# Patient Record
Sex: FEMALE | Race: WHITE | NOT HISPANIC OR LATINO | URBAN - METROPOLITAN AREA
[De-identification: names, ages, dates, MRNs, and addresses within clinical notes are randomized per-mention and may not be internally consistent; named-entity substitution may affect disease eponyms.]

---

## 2021-03-04 ENCOUNTER — OUT OF OFFICE VISIT (OUTPATIENT)
Dept: URBAN - METROPOLITAN AREA MEDICAL CENTER 10 | Facility: MEDICAL CENTER | Age: 66
End: 2021-03-04
Payer: COMMERCIAL

## 2021-03-04 DIAGNOSIS — R19.4 ALTERED BOWEL HABITS: ICD-10-CM

## 2021-03-04 DIAGNOSIS — R74.8 ABNORMAL ALKALINE PHOSPHATASE TEST: ICD-10-CM

## 2021-03-04 DIAGNOSIS — K92.1 BLACK STOOL: ICD-10-CM

## 2021-03-04 DIAGNOSIS — R93.3 ABN FINDINGS-GI TRACT: ICD-10-CM

## 2021-03-04 PROCEDURE — 99204 OFFICE O/P NEW MOD 45 MIN: CPT | Performed by: INTERNAL MEDICINE

## 2021-03-05 ENCOUNTER — OUT OF OFFICE VISIT (OUTPATIENT)
Dept: URBAN - METROPOLITAN AREA MEDICAL CENTER 10 | Facility: MEDICAL CENTER | Age: 66
End: 2021-03-05
Payer: COMMERCIAL

## 2021-03-05 DIAGNOSIS — K31.89 ACQUIRED DEFORMITY OF DUODENUM: ICD-10-CM

## 2021-03-05 DIAGNOSIS — B96.81 BACTERIAL INFECTION DUE TO H. PYLORI: ICD-10-CM

## 2021-03-05 DIAGNOSIS — K29.60 ADENOPAPILLOMATOSIS GASTRICA: ICD-10-CM

## 2021-03-05 DIAGNOSIS — K22.2 ACQUIRED ESOPHAGEAL RING: ICD-10-CM

## 2021-03-05 PROCEDURE — 43239 EGD BIOPSY SINGLE/MULTIPLE: CPT | Performed by: INTERNAL MEDICINE

## 2021-03-22 ENCOUNTER — OFFICE VISIT (OUTPATIENT)
Dept: URBAN - METROPOLITAN AREA CLINIC 78 | Facility: CLINIC | Age: 66
End: 2021-03-22
Payer: COMMERCIAL

## 2021-03-22 VITALS
BODY MASS INDEX: 28.61 KG/M2 | HEIGHT: 64 IN | DIASTOLIC BLOOD PRESSURE: 76 MMHG | HEART RATE: 81 BPM | SYSTOLIC BLOOD PRESSURE: 116 MMHG | RESPIRATION RATE: 16 BRPM | TEMPERATURE: 97.4 F | WEIGHT: 167.6 LBS

## 2021-03-22 DIAGNOSIS — R19.7 DIARRHEA, UNSPECIFIED TYPE: ICD-10-CM

## 2021-03-22 DIAGNOSIS — K76.0 HEPATIC STEATOSIS: ICD-10-CM

## 2021-03-22 DIAGNOSIS — R74.8 ABNORMAL LIVER ENZYMES: ICD-10-CM

## 2021-03-22 DIAGNOSIS — K74.5 BILIARY CIRRHOSIS: ICD-10-CM

## 2021-03-22 PROCEDURE — 99215 OFFICE O/P EST HI 40 MIN: CPT | Performed by: INTERNAL MEDICINE

## 2021-03-22 RX ORDER — SODIUM, POTASSIUM,MAG SULFATES 17.5-3.13G
177 ML DAY 1 AND 177 ML DAY 2 SOLUTION, RECONSTITUTED, ORAL ORAL
Qty: 354 MILLILITER | Refills: 0 | OUTPATIENT
Start: 2021-03-22 | End: 2021-03-24

## 2021-03-22 RX ORDER — URSODIOL 250 MG/1
1 TABLET WITH FOOD TABLET, FILM COATED ORAL THREE TIMES A DAY
Qty: 180 TABLET | Refills: 0 | OUTPATIENT

## 2021-03-22 NOTE — HPI-TODAY'S VISIT:
Patient is seen in post hospital discharge  She had presented there to work up ongoing symptoms for 1 year  As per patient her complains were alternating constipation , diarrhea , dark stools . It started with laxative use for constipation  During the work up she was noted to have Steatohepatitis , Cirrhosis with PHT changes on CT scan  She underwent labs work and dx EGD   SInce discharge from hospital she reports vaginal bleeding ( previous work up by Dr Adam a years ago for post menopausal bleeding was reportedly normal  )    Diarrhea :  Diarrhea is runny and dark  5-7 a day  She was d/c from hospital on Cephalexin for UTI   Has been eating and put food in her stomach   Denies pruritis  Denies periods of confusion  Predominant complain is Fatiguei   Drinks :  Sporadic 1/2 bottle to 1 bottle of wine Stopped Etoh since hospitalizatio   DATA   3/4/21 CT scan IMPRESSION:  1.  Diffuse hepatic steatosis and extensive reticular hepatic parenchymal enhancement. Findings could reflect steatohepatitis and/or steatosis with hepatic fibrosis. No overt morphologic changes of chronic liver disease. 2.  Patent portal vasculature with stigmata of portal hypertension in the form of upper abdominal varices. Fluid third spacing with small volume abdominal pelvic ascites, small left pleural effusion, and mild body wall edema.   Labs :  TB 3.6 > 3.1  DB 1.6   Albumin 2.2   ALT normal  AST  140 ALk PO4 460   INR 1.1 Folic acid 4.4  Vit B 12  661  AMA 41.2  ASMA 1:40 F actin 27 Hepatitis A/B/C negative

## 2021-04-05 ENCOUNTER — OFFICE VISIT (OUTPATIENT)
Dept: URBAN - METROPOLITAN AREA CLINIC 78 | Facility: CLINIC | Age: 66
End: 2021-04-05
Payer: COMMERCIAL

## 2021-04-05 ENCOUNTER — LAB OUTSIDE AN ENCOUNTER (OUTPATIENT)
Dept: URBAN - METROPOLITAN AREA CLINIC 78 | Facility: CLINIC | Age: 66
End: 2021-04-05

## 2021-04-05 VITALS
WEIGHT: 171.6 LBS | HEIGHT: 64 IN | TEMPERATURE: 98 F | HEART RATE: 62 BPM | DIASTOLIC BLOOD PRESSURE: 71 MMHG | BODY MASS INDEX: 29.3 KG/M2 | RESPIRATION RATE: 16 BRPM | SYSTOLIC BLOOD PRESSURE: 102 MMHG

## 2021-04-05 DIAGNOSIS — K74.5 BILIARY CIRRHOSIS: ICD-10-CM

## 2021-04-05 DIAGNOSIS — K22.2 LOWER ESOPHAGEAL RING (SCHATZKI): ICD-10-CM

## 2021-04-05 DIAGNOSIS — K25.9 GASTRIC ULCER, UNSPECIFIED AS ACUTE OR CHRONIC, WITHOUT HEMORRHAGE OR PERFORATION: ICD-10-CM

## 2021-04-05 DIAGNOSIS — K76.0 HEPATIC STEATOSIS: ICD-10-CM

## 2021-04-05 PROBLEM — 235623002: Status: ACTIVE | Noted: 2021-03-23

## 2021-04-05 PROBLEM — 119247004: Status: ACTIVE | Noted: 2021-03-23

## 2021-04-05 PROBLEM — 72007001: Status: ACTIVE | Noted: 2021-03-23

## 2021-04-05 PROBLEM — 723099007: Status: ACTIVE | Noted: 2021-04-05

## 2021-04-05 PROBLEM — 84229001: Status: ACTIVE | Noted: 2021-03-22

## 2021-04-05 PROBLEM — 197321007: Status: ACTIVE | Noted: 2021-03-22

## 2021-04-05 PROBLEM — 1761006: Status: ACTIVE | Noted: 2021-03-22

## 2021-04-05 PROBLEM — 708164002: Status: ACTIVE | Noted: 2021-04-05

## 2021-04-05 PROCEDURE — 99214 OFFICE O/P EST MOD 30 MIN: CPT | Performed by: INTERNAL MEDICINE

## 2021-04-05 RX ORDER — URSODIOL 250 MG/1
1 TABLET WITH FOOD TABLET, FILM COATED ORAL THREE TIMES A DAY
Qty: 180 TABLET | Refills: 0 | Status: ACTIVE | COMMUNITY

## 2021-04-05 RX ORDER — URSODIOL 250 MG/1
1 TABLET WITH FOOD TABLET, FILM COATED ORAL THREE TIMES A DAY
Qty: 180 TABLET | Refills: 0 | OUTPATIENT

## 2021-04-05 RX ORDER — PANTOPRAZOLE SODIUM 40 MG/1
1 TABLET TABLET, DELAYED RELEASE ORAL ONCE A DAY
Qty: 90 | Refills: 1 | OUTPATIENT
Start: 2021-04-05

## 2021-04-05 NOTE — HPI-TODAY'S VISIT:
Patient is seen in follow up  She reports loose stools ( improved down to 5-6 a day )  She has finally started taking abx that was prescribed on d/c from hospital ( it was given for UTI )  Patient continues  denies confusion or insomnia or memory issues  Denies freq urination but freq stooling is a concern   Colonoscopy is scheduled 5/4/21  EGD with path revealed " H.pylori bacteria "  She has not started Benjamin yet  She has not seen Dermatology yet Patient is concerned about her abdominal girth increasing ..      PREVIOUS ENCOUNTER: She had presented there to work up ongoing symptoms for 1 year  As per patient her complains were alternating constipation , diarrhea , dark stools . It started with laxative use for constipation  During the work up she was noted to have Steatohepatitis , Cirrhosis with PHT changes on CT scan  She underwent labs work and dx EGD   SInce discharge from hospital she reports vaginal bleeding ( previous work up by Dr Adam a years ago for post menopausal bleeding was reportedly normal  )    Diarrhea :  Diarrhea is runny and dark  5-7 a day  She was d/c from hospital on Cephalexin for UTI   Has been eating and put food in her stomach   Denies pruritis  Denies periods of confusion  Predominant complain is Fatiguei   Drinks :  Sporadic 1/2 bottle to 1 bottle of wine Stopped Etoh since hospitalizatio   DATA   3/4/21 CT scan IMPRESSION:  1.  Diffuse hepatic steatosis and extensive reticular hepatic parenchymal enhancement. Findings could reflect steatohepatitis and/or steatosis with hepatic fibrosis. No overt morphologic changes of chronic liver disease. 2.  Patent portal vasculature with stigmata of portal hypertension in the form of upper abdominal varices. Fluid third spacing with small volume abdominal pelvic ascites, small left pleural effusion, and mild body wall edema.   Labs :  TB 3.6 > 3.1  DB 1.6   Albumin 2.2   ALT normal  AST  140 ALk PO4 460   INR 1.1 Folic acid 4.4  Vit B 12  661  AMA 41.2  ASMA 1:40 F actin 27 Hepatitis A/B/C negative

## 2021-04-14 ENCOUNTER — OFFICE VISIT (OUTPATIENT)
Dept: URBAN - METROPOLITAN AREA CLINIC 77 | Facility: CLINIC | Age: 66
End: 2021-04-14
Payer: COMMERCIAL

## 2021-04-14 DIAGNOSIS — K76.0 FATTY LIVER: ICD-10-CM

## 2021-04-14 DIAGNOSIS — K74.69 OTHER CIRRHOSIS OF LIVER: ICD-10-CM

## 2021-04-14 PROCEDURE — 76705 ECHO EXAM OF ABDOMEN: CPT | Performed by: INTERNAL MEDICINE

## 2021-04-14 PROCEDURE — 93975 VASCULAR STUDY: CPT | Performed by: INTERNAL MEDICINE

## 2021-04-14 RX ORDER — PANTOPRAZOLE SODIUM 40 MG/1
1 TABLET TABLET, DELAYED RELEASE ORAL ONCE A DAY
Qty: 90 | Refills: 1 | Status: ACTIVE | COMMUNITY
Start: 2021-04-05

## 2021-04-14 RX ORDER — URSODIOL 250 MG/1
1 TABLET WITH FOOD TABLET, FILM COATED ORAL THREE TIMES A DAY
Qty: 180 TABLET | Refills: 0 | Status: ACTIVE | COMMUNITY

## 2021-04-26 ENCOUNTER — OFFICE VISIT (OUTPATIENT)
Dept: URBAN - METROPOLITAN AREA CLINIC 78 | Facility: CLINIC | Age: 66
End: 2021-04-26

## 2021-04-26 ENCOUNTER — DASHBOARD ENCOUNTERS (OUTPATIENT)
Age: 66
End: 2021-04-26

## 2021-04-26 ENCOUNTER — TELEPHONE ENCOUNTER (OUTPATIENT)
Dept: URBAN - METROPOLITAN AREA SURGERY CENTER 30 | Facility: SURGERY CENTER | Age: 66
End: 2021-04-26

## 2021-04-26 RX ORDER — PANTOPRAZOLE SODIUM 40 MG/1
1 TABLET TABLET, DELAYED RELEASE ORAL ONCE A DAY
Qty: 90 | Refills: 1 | Status: ACTIVE | COMMUNITY
Start: 2021-04-05

## 2021-04-26 RX ORDER — URSODIOL 250 MG/1
1 TABLET WITH FOOD TABLET, FILM COATED ORAL THREE TIMES A DAY
Qty: 180 TABLET | Refills: 0 | Status: ACTIVE | COMMUNITY

## 2021-05-04 ENCOUNTER — OFFICE VISIT (OUTPATIENT)
Dept: URBAN - METROPOLITAN AREA SURGERY CENTER 15 | Facility: SURGERY CENTER | Age: 66
End: 2021-05-04
Payer: COMMERCIAL

## 2021-05-04 ENCOUNTER — CLAIMS CREATED FROM THE CLAIM WINDOW (OUTPATIENT)
Dept: URBAN - METROPOLITAN AREA CLINIC 4 | Facility: CLINIC | Age: 66
End: 2021-05-04
Payer: COMMERCIAL

## 2021-05-04 DIAGNOSIS — R19.7 ACUTE DIARRHEA: ICD-10-CM

## 2021-05-04 DIAGNOSIS — K63.5 BENIGN COLON POLYP: ICD-10-CM

## 2021-05-04 DIAGNOSIS — D12.4 BENIGN NEOPLASM OF DESCENDING COLON: ICD-10-CM

## 2021-05-04 DIAGNOSIS — K63.89 STENOSIS OF ILEOCECAL VALVE: ICD-10-CM

## 2021-05-04 DIAGNOSIS — D12.4 ADENOMA OF DESCENDING COLON: ICD-10-CM

## 2021-05-04 PROCEDURE — 45380 COLONOSCOPY AND BIOPSY: CPT | Performed by: INTERNAL MEDICINE

## 2021-05-04 PROCEDURE — G8907 PT DOC NO EVENTS ON DISCHARG: HCPCS | Performed by: INTERNAL MEDICINE

## 2021-05-04 PROCEDURE — 45385 COLONOSCOPY W/LESION REMOVAL: CPT | Performed by: INTERNAL MEDICINE

## 2021-05-04 PROCEDURE — 88305 TISSUE EXAM BY PATHOLOGIST: CPT | Performed by: PATHOLOGY

## 2021-05-04 RX ORDER — PANTOPRAZOLE SODIUM 40 MG/1
1 TABLET TABLET, DELAYED RELEASE ORAL ONCE A DAY
Qty: 90 | Refills: 1 | Status: ACTIVE | COMMUNITY
Start: 2021-04-05

## 2021-05-04 RX ORDER — URSODIOL 250 MG/1
1 TABLET WITH FOOD TABLET, FILM COATED ORAL THREE TIMES A DAY
Qty: 180 TABLET | Refills: 0 | Status: ACTIVE | COMMUNITY